# Patient Record
Sex: MALE | URBAN - METROPOLITAN AREA
[De-identification: names, ages, dates, MRNs, and addresses within clinical notes are randomized per-mention and may not be internally consistent; named-entity substitution may affect disease eponyms.]

---

## 2019-06-05 ENCOUNTER — NEW PATIENT (OUTPATIENT)
Dept: URBAN - METROPOLITAN AREA CLINIC 11 | Facility: CLINIC | Age: 64
End: 2019-06-05

## 2019-06-05 DIAGNOSIS — H25.9: ICD-10-CM

## 2019-06-05 DIAGNOSIS — H43.393: ICD-10-CM

## 2019-06-05 DIAGNOSIS — H33.303: ICD-10-CM

## 2019-06-05 PROCEDURE — 92225 OPHTHALMOSCOPY (INITIAL): CPT

## 2019-06-05 PROCEDURE — 67145 PROPH RTA DTCHMNT PC: CPT

## 2019-06-05 PROCEDURE — 99244 OFF/OP CNSLTJ NEW/EST MOD 40: CPT | Mod: 57

## 2019-06-05 ASSESSMENT — VISUAL ACUITY
OS_PH: 20/25-2
OD_CC: 20/25-1
OS_SC: 20/40

## 2019-06-05 ASSESSMENT — TONOMETRY
OS_IOP_MMHG: 13
OD_IOP_MMHG: 12

## 2019-07-10 ENCOUNTER — FOLLOW UP (OUTPATIENT)
Dept: URBAN - METROPOLITAN AREA CLINIC 11 | Facility: CLINIC | Age: 64
End: 2019-07-10

## 2019-07-10 DIAGNOSIS — H33.303: ICD-10-CM

## 2019-07-10 PROCEDURE — 92012 INTRM OPH EXAM EST PATIENT: CPT

## 2019-07-10 ASSESSMENT — VISUAL ACUITY
OS_PH: 20/25-1
OS_SC: 20/40+1
OD_CC: 20/25+1

## 2019-07-10 ASSESSMENT — TONOMETRY
OS_IOP_MMHG: 14
OD_IOP_MMHG: 12

## 2020-07-14 PROBLEM — Z00.00 ENCOUNTER FOR PREVENTIVE HEALTH EXAMINATION: Status: ACTIVE | Noted: 2020-07-14

## 2020-07-15 ENCOUNTER — APPOINTMENT (OUTPATIENT)
Dept: FAMILY MEDICINE | Facility: CLINIC | Age: 65
End: 2020-07-15
Payer: MEDICARE

## 2020-07-15 VITALS
DIASTOLIC BLOOD PRESSURE: 86 MMHG | SYSTOLIC BLOOD PRESSURE: 130 MMHG | HEART RATE: 50 BPM | RESPIRATION RATE: 16 BRPM | OXYGEN SATURATION: 98 % | WEIGHT: 168 LBS | HEIGHT: 67 IN | TEMPERATURE: 98 F | BODY MASS INDEX: 26.37 KG/M2

## 2020-07-15 DIAGNOSIS — Z80.6 FAMILY HISTORY OF LEUKEMIA: ICD-10-CM

## 2020-07-15 DIAGNOSIS — R03.0 ELEVATED BLOOD-PRESSURE READING, W/OUT DIAGNOSIS OF HYPERTENSION: ICD-10-CM

## 2020-07-15 DIAGNOSIS — M23.209 DERANGEMENT OF UNSPECIFIED MENISCUS DUE TO OLD TEAR OR INJURY, UNSPECIFIED KNEE: ICD-10-CM

## 2020-07-15 DIAGNOSIS — Z82.69 FAMILY HISTORY OF OTHER DISEASES OF THE MUSCULOSKELETAL SYSTEM AND CONNECTIVE TISSUE: ICD-10-CM

## 2020-07-15 DIAGNOSIS — G56.02 CARPAL TUNNEL SYNDROME, LEFT UPPER LIMB: ICD-10-CM

## 2020-07-15 DIAGNOSIS — Z23 ENCOUNTER FOR IMMUNIZATION: ICD-10-CM

## 2020-07-15 DIAGNOSIS — M25.50 PAIN IN UNSPECIFIED JOINT: ICD-10-CM

## 2020-07-15 DIAGNOSIS — Z82.49 FAMILY HISTORY OF ISCHEMIC HEART DISEASE AND OTHER DISEASES OF THE CIRCULATORY SYSTEM: ICD-10-CM

## 2020-07-15 DIAGNOSIS — Z78.9 OTHER SPECIFIED HEALTH STATUS: ICD-10-CM

## 2020-07-15 DIAGNOSIS — M19.90 UNSPECIFIED OSTEOARTHRITIS, UNSPECIFIED SITE: ICD-10-CM

## 2020-07-15 PROCEDURE — 99203 OFFICE O/P NEW LOW 30 MIN: CPT

## 2020-07-15 PROCEDURE — 99205 OFFICE O/P NEW HI 60 MIN: CPT

## 2020-07-15 NOTE — REVIEW OF SYSTEMS
[Patient Intake Form Reviewed] : Patient intake form was reviewed [Joint Pain] : joint pain [Negative] : Neurological

## 2020-07-15 NOTE — HEALTH RISK ASSESSMENT
[Yes] : Yes [1 or 2 (0 pts)] : 1 or 2 (0 points) [Monthly or less (1 pt)] : Monthly or less (1 point) [No] : In the past 12 months have you used drugs other than those required for medical reasons? No [Never (0 pts)] : Never (0 points) [0] : 2) Feeling down, depressed, or hopeless: Not at all (0) [] : No [[id number]] : 0 [Audit-CScore] : 1

## 2020-07-15 NOTE — HISTORY OF PRESENT ILLNESS
[FreeTextEntry1] : New Pt Establishment \par Arthritis pain in hips and lower back for the last 2 weeks\par NKDA\par [facility] [de-identified] : bw 1 day ago\par \par improved post medrol dose pack \par \par ?hx lyme 4-5 yrs ago\par \par acute onset myalgias arthralgias no fever rare night sweats 5x per yr\par activity helps sitting more stiffness

## 2020-07-15 NOTE — PHYSICAL EXAM
[Well Nourished] : well nourished [No Acute Distress] : no acute distress [Well Developed] : well developed [Normal Sclera/Conjunctiva] : normal sclera/conjunctiva [Well-Appearing] : well-appearing [EOMI] : extraocular movements intact [PERRL] : pupils equal round and reactive to light [No JVD] : no jugular venous distention [Normal Oropharynx] : the oropharynx was normal [Normal Outer Ear/Nose] : the outer ears and nose were normal in appearance [Supple] : supple [No Lymphadenopathy] : no lymphadenopathy [Thyroid Normal, No Nodules] : the thyroid was normal and there were no nodules present [No Respiratory Distress] : no respiratory distress  [No Accessory Muscle Use] : no accessory muscle use [Clear to Auscultation] : lungs were clear to auscultation bilaterally [Normal Rate] : normal rate  [No Carotid Bruits] : no carotid bruits [Normal S1, S2] : normal S1 and S2 [Regular Rhythm] : with a regular rhythm [No Murmur] : no murmur heard [No Varicosities] : no varicosities [No Abdominal Bruit] : a ~M bruit was not heard ~T in the abdomen [No Palpable Aorta] : no palpable aorta [No Edema] : there was no peripheral edema [Pedal Pulses Present] : the pedal pulses are present [No Extremity Clubbing/Cyanosis] : no extremity clubbing/cyanosis [Soft] : abdomen soft [Non Tender] : non-tender [Non-distended] : non-distended [No HSM] : no HSM [No Masses] : no abdominal mass palpated [Normal Posterior Cervical Nodes] : no posterior cervical lymphadenopathy [Normal Bowel Sounds] : normal bowel sounds [Normal Anterior Cervical Nodes] : no anterior cervical lymphadenopathy [No CVA Tenderness] : no CVA  tenderness [No Joint Swelling] : no joint swelling [No Spinal Tenderness] : no spinal tenderness [No Rash] : no rash [Grossly Normal Strength/Tone] : grossly normal strength/tone [Normal Gait] : normal gait [Coordination Grossly Intact] : coordination grossly intact [No Focal Deficits] : no focal deficits [Deep Tendon Reflexes (DTR)] : deep tendon reflexes were 2+ and symmetric [Normal Affect] : the affect was normal [Normal Insight/Judgement] : insight and judgment were intact

## 2021-08-17 ENCOUNTER — APPOINTMENT (OUTPATIENT)
Dept: DISASTER EMERGENCY | Facility: CLINIC | Age: 66
End: 2021-08-17

## 2021-08-18 LAB — SARS-COV-2 N GENE NPH QL NAA+PROBE: NOT DETECTED

## 2021-08-30 ENCOUNTER — APPOINTMENT (OUTPATIENT)
Dept: DISASTER EMERGENCY | Facility: CLINIC | Age: 66
End: 2021-08-30

## 2021-08-30 DIAGNOSIS — Z01.818 ENCOUNTER FOR OTHER PREPROCEDURAL EXAMINATION: ICD-10-CM

## 2021-08-31 LAB — SARS-COV-2 N GENE NPH QL NAA+PROBE: NOT DETECTED

## 2021-09-01 ENCOUNTER — TRANSCRIPTION ENCOUNTER (OUTPATIENT)
Age: 66
End: 2021-09-01

## 2021-09-22 ENCOUNTER — APPOINTMENT (OUTPATIENT)
Dept: UROLOGY | Facility: CLINIC | Age: 66
End: 2021-09-22
Payer: MEDICARE

## 2021-09-22 DIAGNOSIS — K40.90 UNILATERAL INGUINAL HERNIA, W/OUT OBSTRUCTION OR GANGRENE, NOT SPECIFIED AS RECURRENT: ICD-10-CM

## 2021-09-22 PROCEDURE — 51798 US URINE CAPACITY MEASURE: CPT

## 2021-09-22 PROCEDURE — 99213 OFFICE O/P EST LOW 20 MIN: CPT

## 2021-12-22 ENCOUNTER — APPOINTMENT (OUTPATIENT)
Dept: UROLOGY | Facility: CLINIC | Age: 66
End: 2021-12-22